# Patient Record
Sex: FEMALE | Race: WHITE | Employment: OTHER | URBAN - METROPOLITAN AREA
[De-identification: names, ages, dates, MRNs, and addresses within clinical notes are randomized per-mention and may not be internally consistent; named-entity substitution may affect disease eponyms.]

---

## 2023-12-29 ENCOUNTER — ANCILLARY PROCEDURE (OUTPATIENT)
Dept: RADIOLOGY | Facility: CLINIC | Age: 63
End: 2023-12-29
Payer: COMMERCIAL

## 2023-12-29 DIAGNOSIS — M25.562 LEFT KNEE PAIN, UNSPECIFIED CHRONICITY: ICD-10-CM

## 2023-12-29 PROCEDURE — 73564 X-RAY EXAM KNEE 4 OR MORE: CPT | Mod: LT

## 2023-12-29 PROCEDURE — 73564 X-RAY EXAM KNEE 4 OR MORE: CPT | Mod: LEFT SIDE | Performed by: RADIOLOGY

## 2024-01-02 ENCOUNTER — OFFICE VISIT (OUTPATIENT)
Dept: ORTHOPEDIC SURGERY | Facility: CLINIC | Age: 64
End: 2024-01-02
Payer: COMMERCIAL

## 2024-01-02 ENCOUNTER — APPOINTMENT (OUTPATIENT)
Dept: RADIOLOGY | Facility: CLINIC | Age: 64
End: 2024-01-02
Payer: COMMERCIAL

## 2024-01-02 DIAGNOSIS — M11.262 CHONDROCALCINOSIS OF LEFT KNEE: ICD-10-CM

## 2024-01-02 DIAGNOSIS — M17.12 PRIMARY OSTEOARTHRITIS OF LEFT KNEE: Primary | ICD-10-CM

## 2024-01-02 PROCEDURE — 99214 OFFICE O/P EST MOD 30 MIN: CPT | Performed by: STUDENT IN AN ORGANIZED HEALTH CARE EDUCATION/TRAINING PROGRAM

## 2024-01-02 PROCEDURE — 2500000004 HC RX 250 GENERAL PHARMACY W/ HCPCS (ALT 636 FOR OP/ED): Performed by: STUDENT IN AN ORGANIZED HEALTH CARE EDUCATION/TRAINING PROGRAM

## 2024-01-02 PROCEDURE — 2500000005 HC RX 250 GENERAL PHARMACY W/O HCPCS: Performed by: STUDENT IN AN ORGANIZED HEALTH CARE EDUCATION/TRAINING PROGRAM

## 2024-01-02 PROCEDURE — 20610 DRAIN/INJ JOINT/BURSA W/O US: CPT | Performed by: STUDENT IN AN ORGANIZED HEALTH CARE EDUCATION/TRAINING PROGRAM

## 2024-01-02 RX ORDER — TRAZODONE HYDROCHLORIDE 150 MG/1
1 TABLET ORAL NIGHTLY
COMMUNITY
Start: 2023-06-14 | End: 2024-06-13

## 2024-01-02 RX ORDER — CITALOPRAM 40 MG/1
40 TABLET, FILM COATED ORAL
COMMUNITY
Start: 2023-06-14 | End: 2024-06-13

## 2024-01-02 RX ORDER — LISINOPRIL 2.5 MG/1
2.5 TABLET ORAL
COMMUNITY
Start: 2023-06-12 | End: 2024-06-11

## 2024-01-02 RX ORDER — ATORVASTATIN CALCIUM 20 MG/1
20 TABLET, FILM COATED ORAL
COMMUNITY
Start: 2023-06-12 | End: 2024-06-11

## 2024-01-02 RX ORDER — ZOLPIDEM TARTRATE 5 MG/1
TABLET ORAL
COMMUNITY
Start: 2023-05-03 | End: 2024-05-08

## 2024-01-02 RX ORDER — SEMAGLUTIDE 0.68 MG/ML
0.5 INJECTION, SOLUTION SUBCUTANEOUS
COMMUNITY
Start: 2023-06-14 | End: 2024-06-13

## 2024-01-02 RX ORDER — EMPAGLIFLOZIN 25 MG/1
25 TABLET, FILM COATED ORAL
COMMUNITY
Start: 2023-06-12 | End: 2024-06-11

## 2024-01-02 RX ORDER — CHOLECALCIFEROL (VITAMIN D3) 25 MCG
TABLET ORAL
COMMUNITY
Start: 2013-01-18

## 2024-01-02 RX ORDER — BUPROPION HYDROCHLORIDE 300 MG/1
300 TABLET ORAL
COMMUNITY
Start: 2023-06-14 | End: 2024-06-13

## 2024-01-02 RX ADMIN — LIDOCAINE HYDROCHLORIDE 4 ML: 10 INJECTION, SOLUTION INFILTRATION; PERINEURAL at 22:22

## 2024-01-02 RX ADMIN — TRIAMCINOLONE ACETONIDE 1 ML: 40 INJECTION, SUSPENSION INTRA-ARTICULAR; INTRAMUSCULAR at 22:22

## 2024-01-02 ASSESSMENT — PAIN - FUNCTIONAL ASSESSMENT: PAIN_FUNCTIONAL_ASSESSMENT: NO/DENIES PAIN

## 2024-01-02 NOTE — PROGRESS NOTES
PRIMARY CARE PHYSICIAN: Mell Miramontes  REFERRING PROVIDER: No referring provider defined for this encounter.       SUBJECTIVE  CHIEF COMPLAINT:   Chief Complaint   Patient presents with    Left Knee - New Patient Visit        HPI: Shannan George is a pleasant 63 y.o. year-old female who is seen today for evaluation of left knee pain. The pain has been present for 6 months. The onset of pain was not associated with a traumatic injury.  Shannan George states that the pain is located in the lateral aspect of her knee.  Shannan George denies any history of inflammatory arthritis. She had her left knee scoped in 1987 and 1990 for likely menisectomy. No history of injections. The pain is aggravated by standing for long periods of time and when she extends her leg and alleviated by rest and flexing her leg. Pain is worse in the morning and improves throughout the day. The patient denies any numbness or tingling of the left lower extremity.    She has a history of type 2 diabetes managed with medications. No history of heart attack, stroke or blood clots. Does have a family history of PEs    FUNCTIONAL STATUS: occasionally limited.  AMBULATORY STATUS: Independent community ambulation without devices  PREVIOUS TREATMENTS: over the counter medications   HISTORY OF SURGERY ON AFFECTED KNEE(S): Yes   HIP OR GROIN PAIN REPORTED: No   SYMPTOMS INTERFERING WITH SLEEP: Yes   INSTABILITY: No   IMPACTING QUALITY OF LIFE: Yes     REVIEW OF SYSTEMS  The patient denies any fever, chills, chest pain, shortness of breath or difficulty breathing.  Patient denies any numbness, tingling, or radicular symptoms.  Adult patient history sheet was filled out by the patient today in clinic and will be scanned into the EMR.  I personally reviewed this form which will be scanned into the EMR.  This includes Past Medical History, Past Surgical History, Medications, Allergies, Social History, Family History and 12 point review of systems.    No past  medical history on file.     Allergies   Allergen Reactions    Naproxen Hives and Itching     (naprosyn) hives and whelts        No past surgical history on file.     No family history on file.     Social History     Socioeconomic History    Marital status:      Spouse name: Not on file    Number of children: Not on file    Years of education: Not on file    Highest education level: Not on file   Occupational History    Not on file   Tobacco Use    Smoking status: Not on file    Smokeless tobacco: Not on file   Substance and Sexual Activity    Alcohol use: Not on file    Drug use: Not on file    Sexual activity: Not on file   Other Topics Concern    Not on file   Social History Narrative    Not on file     Social Determinants of Health     Financial Resource Strain: Not on file   Food Insecurity: Not on file   Transportation Needs: Not on file   Physical Activity: Not on file   Stress: Not on file   Social Connections: Not on file   Intimate Partner Violence: Not on file   Housing Stability: Not on file        CURRENT MEDICATIONS:   Current Outpatient Medications   Medication Sig Dispense Refill    atorvastatin (Lipitor) 20 mg tablet Take 1 tablet (20 mg) by mouth once daily.      buPROPion XL (Wellbutrin XL) 300 mg 24 hr tablet Take 1 tablet (300 mg) by mouth once daily.      cholecalciferol (Vitamin D-3) 25 MCG (1000 UT) tablet 3 po q day      citalopram (CeleXA) 40 mg tablet Take 1 tablet (40 mg) by mouth once daily.      Jardiance 25 mg Take 1 tablet (25 mg) by mouth once daily.      lisinopril 2.5 mg tablet Take 1 tablet (2.5 mg) by mouth once daily.      Ozempic 0.25 mg or 0.5 mg (2 mg/3 mL) pen injector Inject 0.5 mg under the skin.      traZODone (Desyrel) 150 mg tablet Take 1 tablet (150 mg) by mouth once daily at bedtime.      zolpidem (Ambien) 5 mg tablet        No current facility-administered medications for this visit.        OBJECTIVE    PHYSICAL EXAM  There is no height or weight on file to  calculate BMI.    General: Well-appearing female in no acute distress.  Awake, alert and oriented.  Pleasant and cooperative.  Respiratory: Non-labored breathing  Mood: Euthymic   Gait: Non antalgic   Assistive Device: None     Affected Left Knee  Limb Alignment: Neutral  ROM: 5 - 110  Stable to varus and valgus stress at full extension and 30 degrees of flexion  Skin: Intact, no abrasions or draining sinuses  Effusion: None  Quad Strength: 5/5  Hamstring Strength: 5/5  Patella Crepitus: Present  Patella Grind: None  Tenderness: Lateral joint line tenderness  Sensation: Intact to light touch distally  Motor function: Able to fire TA, EHL, G/S  Pulses: Palpable DP pulse    Unaffected Right Knee  Skin: Intact  ROM: 0-120  Effusion: None  No tenderness to palpation on exam    IMAGING:  AP / lateral/ mid-flexion/sunrise views: Independent review of left knee x-rays was performed. The findings were reviewed with the patient. There were calcifications in the lateral compartment, likely chondrocalcinosis. There are mild degenerative changes of the left knee with neutral  limb alignment. There is mild joint space narrowing, subchondral sclerosis, and osteophyte formation. No evidence of fracture, AVN, dislocation, osteomyelitis.        ASSESSMENT & PLAN    IMPRESSION:  Shannan George is a 63 y.o. female who is seen today for evaluation of left knee pain.  It is my impression the patient has chondrocalcinosis with superimposed osteoarthritis.    PLAN:  I had an in-depth discussion about the nature and natural history of degenerative joint disease.  I explained that it is progressive, but there is no way to predict how quickly a patient's symptoms will get worse.  I advised that the patient that they can manage their pain symptomatically, with 3-5 day courses of nonsteroidal anti-inflammatories and activity modification as needed when there is a a flare up. I explained to Shannan George that the best interventions they can take  to perhaps slow the progression of the degenerative changes in the long-term are maintaining a healthy weight and performing low impact exercise such as cycling, walking, and swimming. The use of a walking stick, cane or other assistive device can help as well.     I also discussed more invasive treatment options including injections and radiofrequency nerve ablation. I talked about the risks and benefits of injections, focusing on the fact that there is no way to predict the degree or duration of symptom relief, but that it can provide weeks to months of pain relief in most patients. Should these measures fail, the most invasive option would be joint replacement surgery. The patient should try and delay joint replacement surgery for as long as possible. If the patients' joint problem affects their quality of life and cause significant restrictions of their activities, they may want to consider joint replacement surgery. I briefly covered the risks, benefits and expected recovery after total joint arthroplasty.    Shannan George may one day benefit from an elective total joint replacement however has not yet exhausted other treatment options. I have recommended:  1. Intra-articular injection  Patient ID: Shannan George is a 63 y.o. female.    L Inj/Asp: L knee on 1/2/2024 10:22 PM  Indications: pain and joint swelling  Details: 22 G needle, anterolateral approach  Medications: 4 mL lidocaine 10 mg/mL (1 %); 1 mL triamcinolone acetonide 40 mg/mL    Discussion:  I discussed the conservative treatment options for knee osteoarthritis including but not limited to physical therapy, oral NSAIDS, activity and lifestyle modification, and corticosteroid injections. Pt has elected to undergo a cortisone injection today. I have explained the risk and benefits of an injection including the possibility of joint infection, bleeding, damage to cartilage, allergic reaction. Patient verbalized understanding and gave verbal consent wishes  to proceed with a intra-articular cortisone injection for their knee.    Procedure:  After discussing the risk and benefits of the procedure, we proceeded with an intra-articular left knee injection.    With the patient's informed verbal consent, the left knee was prepped in standard sterile fashion with Chlorhexidine. The skin was then anesthetized with ethyl chloride spray and cleaned again with Chlorhexidine. The knee was then apirated/injected with a prefilled 20-gauge syringe of 40 mg Kenalog + 4 ml Lidocaine using the lateral approach without complications.  The patient tolerated this well and felt immediate initial relief of symptoms. A bandaid was applied and the patient ambulated out of the clinic on ther own accord without difficulty. Patient was instructed to avoid physical activity for 24-48 hours to prevent the knees from swelling and may ice the knees as tolerated. Patient should contact the office if any signs of of infection appear: redness, fever, chills, drainage, swelling or warmth to the knees.  Pt understands that the injections can be repeated no sooner than 3 months.  Procedure, treatment alternatives, risks and benefits explained, specific risks discussed. Consent was given by the patient. Immediately prior to procedure a time out was called to verify the correct patient, procedure, equipment, support staff and site/side marked as required. Patient was prepped and draped in the usual sterile fashion.         We will plan on re-assessing the status in 4 months, or sooner if symptoms worsen.     All of the patient's questions were answered and he was comfortable with this plan of care.  Shannan George was encouraged to call if any problems, questions or concerns arise.     The patient was seen and examined with my resident Dr. Chowdhury who assisted with documentation.  I independently interviewed the patient to obtain a history and performed physical examination.  I formulated the plan of  care.    * This office note was dictated using Dragon voice to text software and was not proofread for spelling or grammatical errors *

## 2024-01-19 RX ORDER — TRIAMCINOLONE ACETONIDE 40 MG/ML
1 INJECTION, SUSPENSION INTRA-ARTICULAR; INTRAMUSCULAR
Status: COMPLETED | OUTPATIENT
Start: 2024-01-02 | End: 2024-01-02

## 2024-01-19 RX ORDER — LIDOCAINE HYDROCHLORIDE 10 MG/ML
4 INJECTION INFILTRATION; PERINEURAL
Status: COMPLETED | OUTPATIENT
Start: 2024-01-02 | End: 2024-01-02

## 2024-05-07 ENCOUNTER — OFFICE VISIT (OUTPATIENT)
Dept: ORTHOPEDIC SURGERY | Facility: CLINIC | Age: 64
End: 2024-05-07
Payer: COMMERCIAL

## 2024-05-07 DIAGNOSIS — M17.12 PRIMARY OSTEOARTHRITIS OF LEFT KNEE: ICD-10-CM

## 2024-05-07 PROCEDURE — 20610 DRAIN/INJ JOINT/BURSA W/O US: CPT | Performed by: STUDENT IN AN ORGANIZED HEALTH CARE EDUCATION/TRAINING PROGRAM

## 2024-05-07 PROCEDURE — 99213 OFFICE O/P EST LOW 20 MIN: CPT | Performed by: STUDENT IN AN ORGANIZED HEALTH CARE EDUCATION/TRAINING PROGRAM

## 2024-05-07 ASSESSMENT — PAIN DESCRIPTION - DESCRIPTORS: DESCRIPTORS: ACHING;PRESSURE

## 2024-05-07 ASSESSMENT — PAIN SCALES - GENERAL: PAINLEVEL_OUTOF10: 2

## 2024-05-07 ASSESSMENT — PAIN - FUNCTIONAL ASSESSMENT: PAIN_FUNCTIONAL_ASSESSMENT: 0-10
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
## 2024-09-10 ENCOUNTER — OFFICE VISIT (OUTPATIENT)
Dept: ORTHOPEDIC SURGERY | Facility: CLINIC | Age: 64
End: 2024-09-10
Payer: COMMERCIAL

## 2024-09-10 DIAGNOSIS — M17.12 PRIMARY OSTEOARTHRITIS OF LEFT KNEE: Primary | ICD-10-CM

## 2024-09-10 PROCEDURE — 2500000004 HC RX 250 GENERAL PHARMACY W/ HCPCS (ALT 636 FOR OP/ED): Performed by: STUDENT IN AN ORGANIZED HEALTH CARE EDUCATION/TRAINING PROGRAM

## 2024-09-10 PROCEDURE — 1036F TOBACCO NON-USER: CPT | Performed by: STUDENT IN AN ORGANIZED HEALTH CARE EDUCATION/TRAINING PROGRAM

## 2024-09-10 PROCEDURE — 20610 DRAIN/INJ JOINT/BURSA W/O US: CPT | Mod: LT | Performed by: STUDENT IN AN ORGANIZED HEALTH CARE EDUCATION/TRAINING PROGRAM

## 2024-09-10 PROCEDURE — 2500000005 HC RX 250 GENERAL PHARMACY W/O HCPCS: Performed by: STUDENT IN AN ORGANIZED HEALTH CARE EDUCATION/TRAINING PROGRAM

## 2024-09-10 PROCEDURE — 99214 OFFICE O/P EST MOD 30 MIN: CPT | Performed by: STUDENT IN AN ORGANIZED HEALTH CARE EDUCATION/TRAINING PROGRAM

## 2024-09-10 RX ORDER — TRIAMCINOLONE ACETONIDE 40 MG/ML
40 INJECTION, SUSPENSION INTRA-ARTICULAR; INTRAMUSCULAR
Status: COMPLETED | OUTPATIENT
Start: 2024-09-10 | End: 2024-09-10

## 2024-09-10 RX ORDER — LIDOCAINE HYDROCHLORIDE 10 MG/ML
4 INJECTION INFILTRATION; PERINEURAL
Status: COMPLETED | OUTPATIENT
Start: 2024-09-10 | End: 2024-09-10
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
## 2025-01-14 ENCOUNTER — APPOINTMENT (OUTPATIENT)
Dept: RADIOLOGY | Facility: CLINIC | Age: 65
End: 2025-01-14
Payer: COMMERCIAL

## 2025-01-14 ENCOUNTER — APPOINTMENT (OUTPATIENT)
Dept: ORTHOPEDIC SURGERY | Facility: CLINIC | Age: 65
End: 2025-01-14
Payer: COMMERCIAL

## 2025-01-14 DIAGNOSIS — M25.562 LEFT KNEE PAIN, UNSPECIFIED CHRONICITY: ICD-10-CM

## 2025-04-01 ENCOUNTER — OFFICE VISIT (OUTPATIENT)
Dept: ORTHOPEDIC SURGERY | Facility: CLINIC | Age: 65
End: 2025-04-01
Payer: COMMERCIAL

## 2025-04-01 ENCOUNTER — HOSPITAL ENCOUNTER (OUTPATIENT)
Dept: RADIOLOGY | Facility: CLINIC | Age: 65
Discharge: HOME | End: 2025-04-01
Payer: COMMERCIAL

## 2025-04-01 DIAGNOSIS — M17.12 PRIMARY OSTEOARTHRITIS OF LEFT KNEE: Primary | ICD-10-CM

## 2025-04-01 DIAGNOSIS — M25.562 LEFT KNEE PAIN, UNSPECIFIED CHRONICITY: ICD-10-CM

## 2025-04-01 PROCEDURE — 73564 X-RAY EXAM KNEE 4 OR MORE: CPT | Mod: LEFT SIDE | Performed by: RADIOLOGY

## 2025-04-01 PROCEDURE — 1036F TOBACCO NON-USER: CPT | Performed by: STUDENT IN AN ORGANIZED HEALTH CARE EDUCATION/TRAINING PROGRAM

## 2025-04-01 PROCEDURE — 99213 OFFICE O/P EST LOW 20 MIN: CPT | Performed by: STUDENT IN AN ORGANIZED HEALTH CARE EDUCATION/TRAINING PROGRAM

## 2025-04-01 PROCEDURE — 99213 OFFICE O/P EST LOW 20 MIN: CPT | Mod: 25 | Performed by: STUDENT IN AN ORGANIZED HEALTH CARE EDUCATION/TRAINING PROGRAM

## 2025-04-01 PROCEDURE — 2500000004 HC RX 250 GENERAL PHARMACY W/ HCPCS (ALT 636 FOR OP/ED): Performed by: STUDENT IN AN ORGANIZED HEALTH CARE EDUCATION/TRAINING PROGRAM

## 2025-04-01 PROCEDURE — 73564 X-RAY EXAM KNEE 4 OR MORE: CPT | Mod: LT

## 2025-04-01 PROCEDURE — 20610 DRAIN/INJ JOINT/BURSA W/O US: CPT | Mod: LT | Performed by: STUDENT IN AN ORGANIZED HEALTH CARE EDUCATION/TRAINING PROGRAM

## 2025-04-01 RX ORDER — LIDOCAINE HYDROCHLORIDE 10 MG/ML
4 INJECTION, SOLUTION INFILTRATION; PERINEURAL
Status: COMPLETED | OUTPATIENT
Start: 2025-04-01 | End: 2025-04-01

## 2025-04-01 RX ORDER — TRIAMCINOLONE ACETONIDE 40 MG/ML
40 INJECTION, SUSPENSION INTRA-ARTICULAR; INTRAMUSCULAR
Status: COMPLETED | OUTPATIENT
Start: 2025-04-01 | End: 2025-04-01

## 2025-04-01 RX ADMIN — TRIAMCINOLONE ACETONIDE 40 MG: 40 INJECTION, SUSPENSION INTRA-ARTICULAR; INTRAMUSCULAR at 14:51

## 2025-04-01 RX ADMIN — LIDOCAINE HYDROCHLORIDE 4 ML: 10 INJECTION, SOLUTION INFILTRATION; PERINEURAL at 14:51

## 2025-04-01 NOTE — PROGRESS NOTES
PRIMARY CARE PHYSICIAN: Mell Miramontes  REFERRING PROVIDER: No referring provider defined for this encounter.       SUBJECTIVE  CHIEF COMPLAINT:   Chief Complaint   Patient presents with    Left Knee - Follow-up        HPI: Shannan George is a pleasant 64 y.o. year-old female who is seen today for follow-up evaluation of left knee pain.  I last saw the patient in September.  That injection provided over that 4-5 months of relief.      The pain returned over the last month.  She has had no fall or trauma.  Pain is diffuse.  She is ambulatory that assistive device.  She is going on vacation soon.    She has a history of type 2 diabetes managed with medications. No history of heart attack, stroke or blood clots. Does have a family history of PEs    FUNCTIONAL STATUS: occasionally limited.  AMBULATORY STATUS: Independent community ambulation without devices  PREVIOUS TREATMENTS: over the counter medications   HISTORY OF SURGERY ON AFFECTED KNEE(S): Yes   HIP OR GROIN PAIN REPORTED: No   SYMPTOMS INTERFERING WITH SLEEP: Yes   INSTABILITY: No   IMPACTING QUALITY OF LIFE: Yes     REVIEW OF SYSTEMS  There has been no interval change in this patient's past medical, surgical, medications, allergies, family history or social history since the most recent visit to a provider within our department.  14 point review of systems was performed, reviewed, and negative except for pertinent positives documented in the history of present illness.    History reviewed. No pertinent past medical history.     Allergies   Allergen Reactions    Naproxen Hives and Itching     (naprosyn) hives and whelts        History reviewed. No pertinent surgical history.     No family history on file.     Social History     Socioeconomic History    Marital status:      Spouse name: Not on file    Number of children: Not on file    Years of education: Not on file    Highest education level: Not on file   Occupational History    Not on file   Tobacco  Use    Smoking status: Never    Smokeless tobacco: Never   Substance and Sexual Activity    Alcohol use: Not on file    Drug use: Not on file    Sexual activity: Not on file   Other Topics Concern    Not on file   Social History Narrative    Not on file     Social Drivers of Health     Financial Resource Strain: Low Risk  (3/4/2025)    Received from [facility]    Overall Financial Resource Strain (CARDIA)     Difficulty of Paying Living Expenses: Not hard at all   Food Insecurity: No Food Insecurity (3/4/2025)    Received from [facility]    Hunger Vital Sign     Worried About Running Out of Food in the Last Year: Never true     Ran Out of Food in the Last Year: Never true   Transportation Needs: No Transportation Needs (3/4/2025)    Received from [facility]    PRAPARE - Transportation     Lack of Transportation (Medical): No     Lack of Transportation (Non-Medical): No   Physical Activity: Insufficiently Active (3/4/2025)    Received from [facility]    Exercise Vital Sign     Days of Exercise per Week: 3 days     Minutes of Exercise per Session: 20 min   Stress: Stress Concern Present (3/4/2025)    Received from [facility]    [employer] [place] [employer] - Occupational Stress Questionnaire     Feeling of Stress : To some extent   Social Connections: Moderately Integrated (3/4/2025)    Received from [facility]    Social Connection and Isolation Panel [NHANES]     Frequency of Communication with Friends and Family: More than three times a week     Frequency of Social Gatherings with Friends and Family: Twice a week     Attends Mormonism Services: 1 to 4 times per year     Active Member of Clubs or Organizations: Yes     Attends Club or Organization Meetings: 1 to 4 times per year     Marital Status:    Intimate Partner Violence: Not At Risk (3/4/2025)    Received from [facility]    Humiliation, Afraid, Rape, and Kick questionnaire     Fear of Current or Ex-Partner: No     Emotionally Abused:  No     Physically Abused: No     Sexually Abused: No   Housing Stability: Low Risk  (3/4/2025)    [facility] from [facility]    Housing Stability Vital Sign     Unable to Pay for Housing in the Last Year: No     Number of Times Moved in the Last Year: 0     Homeless in the Last Year: No        CURRENT MEDICATIONS:   Current Outpatient Medications   Medication Sig Dispense Refill    atorvastatin (Lipitor) 20 mg tablet Take 1 tablet (20 mg) by mouth once daily.      buPROPion XL (Wellbutrin XL) 300 mg 24 hr tablet Take 1 tablet (300 mg) by mouth once daily.      cholecalciferol (Vitamin D-3) 25 MCG (1000 UT) tablet 3 po q day      citalopram (CeleXA) 40 mg tablet Take 1 tablet (40 mg) by mouth once daily.      Jardiance 25 mg Take 1 tablet (25 mg) by mouth once daily.      lisinopril 2.5 mg tablet Take 1 tablet (2.5 mg) by mouth once daily.      Ozempic 0.25 mg or 0.5 mg (2 mg/3 mL) pen injector Inject 0.5 mg under the skin.      traZODone (Desyrel) 150 mg tablet Take 1 tablet (150 mg) by mouth once daily at bedtime.       No current facility-administered medications for this visit.        OBJECTIVE    PHYSICAL EXAM  There is no height or weight on file to calculate BMI.    General: Well-appearing female in no acute distress.  Awake, alert and oriented.  Pleasant and cooperative.  Respiratory: Non-labored breathing  Mood: Euthymic   Gait: Non antalgic   Assistive Device: None     Affected Left Knee  Limb Alignment: Neutral  ROM: 5 - 110  Stable to varus and valgus stress at full extension and 30 degrees of flexion  Skin: Intact, no abrasions or draining sinuses  Effusion: None  Quad Strength: 5/5  Hamstring Strength: 5/5  Patella Crepitus: Present  Patella Grind: None  Tenderness: Lateral joint line tenderness  Sensation: Intact to light touch distally  Motor function: Able to fire TA, EHL, G/S  Pulses: Palpable DP pulse    IMAGING:  Radiographs today demonstrate chondrocalcinosis.  Tricompartmental osteoarthritis with  valgus limb alignment.  No fracture or dislocation.      ASSESSMENT & PLAN    IMPRESSION:  Shannan George is a 64 y.o. female who is seen today for evaluation of left knee pain.  It is my impression the patient has chondrocalcinosis with superimposed osteoarthritis.    PLAN:  Patient had a good response to the intra-articular injection.  We will repeat the injection today.  Risks of repeat injection were discussed.    Patient ID: Shannan George is a 64 y.o. female.    L Inj/Asp: L knee on 4/1/2025 2:51 PM  Indications: pain and joint swelling  Details: 22 G needle, anterolateral approach  Medications: 40 mg triamcinolone acetonide 40 mg/mL; 4 mL lidocaine 10 mg/mL (1 %)  Outcome: tolerated well, no immediate complications    Discussion:  I discussed the conservative treatment options for knee osteoarthritis including but not limited to physical therapy, oral NSAIDS, activity and lifestyle modification, and corticosteroid injections. Pt has elected to undergo a cortisone injection today. I have explained the risk and benefits of an injection including the possibility of joint infection, bleeding, damage to cartilage, allergic reaction. Patient verbalized understanding and gave verbal consent wishes to proceed with a intra-articular cortisone injection for their knee.    Procedure:  After discussing the risk and benefits of the procedure, we proceeded with an intra-articular left knee injection.    With the patient's informed verbal consent, the left knee was prepped in standard sterile fashion with Chlorhexidine. The skin was then anesthetized with ethyl chloride spray and cleaned again with Chlorhexidine. The knee was then apirated/injected with a prefilled 20-gauge syringe of 40 mg Kenalog + 4 ml Lidocaine using the lateral approach without complications.  The patient tolerated this well and felt immediate initial relief of symptoms. A bandaid was applied and the patient ambulated out of the clinic on ther own accord  without difficulty. Patient was instructed to avoid physical activity for 24-48 hours to prevent the knees from swelling and may ice the knees as tolerated. Patient should contact the office if any signs of of infection appear: redness, fever, chills, drainage, swelling or warmth to the knees.  Pt understands that the injections can be repeated no sooner than 3 months.  Procedure, treatment alternatives, risks and benefits explained, specific risks discussed. Consent was given by the patient. Immediately prior to procedure a time out was called to verify the correct patient, procedure, equipment, support staff and site/side marked as required. Patient was prepped and draped in the usual sterile fashion.       Follow-up in 6 months.  No radiographs unless there is a significant change in the patient's symptoms.    *This note was created using voice recognition software and was not corrected for typographical or grammatical errors.*

## 2025-07-29 NOTE — PROGRESS NOTES
VELIA George is a pleasant 64 y.o. year-old female here for follow-up of known underlying left knee osteoarthritis. She presents for repeat evaluation. Their last injection was 25. She is getting about 3 months of relief out of corticosteroid injections and reports it has worked well.  She feels she has a 100% improvement of her symptoms after the injection. The patient notes persistent pain as well as some occasional mechanical symptoms. Pain level: Ranges from a 3 to a 9. She uses OTC medications for pain control. She has not had any interval fall or trauma. Pt ambulates without an assistive device. Pain is located in the anterior and lateral aspect of the knee. The Pt has elected to undergo injection today.     Review of systems  There has been no interval change in this patient's past medical, surgical, medications, allergies, family history or social history since the most recent visit to a provider within our department. 14 point review of systems was performed, reviewed, and negative except for pertinent positives documented in the history of present illness.    Medical History[1]  Problem List[2]  Medication Documentation Review Audit       Reviewed by Wendy Ledesma MA (Medical Assistant) on 25 at 1352      Medication Order Taking? Sig Documenting Provider Last Dose Status   atorvastatin (Lipitor) 20 mg tablet [id number] No Take 1 tablet (20 mg) by mouth once daily. Historical Provider, MD Taking  24 235   buPROPion XL (Wellbutrin XL) 300 mg 24 hr tablet [id number] No Take 1 tablet (300 mg) by mouth once daily. Historical Provider, MD Taking  24 235   cholecalciferol (Vitamin D-3) 25 MCG (1000 UT) tablet [id number] No 3 po q day Historical Provider, MD Taking Active   citalopram (CeleXA) 40 mg tablet [id number] No Take 1 tablet (40 mg) by mouth once daily. Historical MD Jenny Taking  24 235   Jardiance 25 mg [id number] No Take 1 tablet (25  mg) by mouth once daily. Historical Provider, MD Taking  24   lisinopril 2.5 mg tablet [id number] No Take 1 tablet (2.5 mg) by mouth once daily. Mumtaz Alvarado MD Taking  24   Ozempic 0.25 mg or 0.5 mg (2 mg/3 mL) pen injector [id number] No Inject 0.5 mg under the skin. Mumtaz Alvarado MD Taking  24   traZODone (Desyrel) 150 mg tablet [id number] No Take 1 tablet (150 mg) by mouth once daily at bedtime. Mumtaz Alvarado MD Taking  24                  RX Allergies[3]  Social History     Socioeconomic History    Marital status:      Spouse name: Not on file    Number of children: Not on file    Years of education: Not on file    Highest education level: Not on file   Occupational History    Not on file   Tobacco Use    Smoking status: Never    Smokeless tobacco: Never   Substance and Sexual Activity    Alcohol use: Never    Drug use: Never    Sexual activity: Not Currently     Partners: Male     Birth control/protection: Post-menopausal   Other Topics Concern    Not on file   Social History Narrative    Not on file     Social Drivers of Health     Financial Resource Strain: Low Risk  (3/4/2025)    Received from [facility]    Overall Financial Resource Strain (CARDIA)     Difficulty of Paying Living Expenses: Not hard at all   Food Insecurity: No Food Insecurity (3/4/2025)    Received from [facility]    Hunger Vital Sign     Worried About Running Out of Food in the Last Year: Never true     Ran Out of Food in the Last Year: Never true   Transportation Needs: No Transportation Needs (3/4/2025)    Received from [facility]    PRAPARE - Transportation     Lack of Transportation (Medical): No     Lack of Transportation (Non-Medical): No   Physical Activity: Insufficiently Active (3/4/2025)    Received from [facility]    Exercise Vital Sign     Days of Exercise per Week: 3 days     Minutes of Exercise per Session: 20 min   Stress:  Stress Concern Present (3/4/2025)    Received from [facility]    [place] [employer] - Occupational Stress Questionnaire     Feeling of Stress : To some extent   Social Connections: Moderately Integrated (3/4/2025)    Received from [employer] [facility]    Social Connection and Isolation Panel     Frequency of Communication with Friends and Family: More than three times a week     Frequency of Social Gatherings with Friends and Family: Twice a week     Attends Holiness Services: 1 to 4 times per year     Active Member of Clubs or Organizations: Yes     Attends Club or Organization Meetings: 1 to 4 times per year     Marital Status:    Intimate Partner Violence: Not At Risk (3/4/2025)    Received from [facility] [facility]    Humiliation, Afraid, Rape, and Kick questionnaire     Fear of Current or Ex-Partner: No     Emotionally Abused: No     Physically Abused: No     Sexually Abused: No   Housing Stability: Low Risk  (3/4/2025)    Received from [facility] [facility]    Housing Stability Vital Sign     Unable to Pay for Housing in the Last Year: No     Number of Times Moved in the Last Year: 0     Homeless in the Last Year: No     Surgical History[4]    Physical Exam  General: Well-appearing female in no acute distress.  Awake, alert and oriented.  Pleasant and cooperative.  Respiratory: Non-labored breathing  Mood: Euthymic   Gait: Non antalgic  Assistive Device: no device  Skin: Warm, dry and intact without lesions.    Affected Left Knee  Limb Alignment: Neutral  ROM: 5 - 110  Stable to varus and valgus stress at full extension and 30 degrees of flexion  Skin: Intact, no abrasions or draining sinuses  Effusion: None  Quad Strength: 5/5  Hamstring Strength: 5/5  Patella Crepitus: Present  Patella Grind: None  Tenderness: TTP over the anterior aspect of the knee  Sensation: Intact to light touch distally  Motor function: Able to fire TA, EHL, G/S  Pulses: Palpable DP pulse     Imaging  No imaging obtained  today.    Impression/Plan  Shannan George is a 64 y.o. female who is seen today for chondrocalcinosis with superimposed osteoarthritis who I am seeing today for follow-up evaluation of left knee pain in the setting of moderate osteoarthritis getting adequate relief from injection therapy.    We discussed conservative treatment options. The patient is still responding well to intra-articular injections. I do not see a clear indication to move forward with arthroplasty. I do believe that at some point in the future, patient will benefit from a total knee arthroplasty but we can continue with conservative treatments until such time.     Patient was instructed to avoid physical activity for 24-48 hours to prevent the knees from swelling and may ice the knees as tolerated. Patient should avoid tub bathing or swimming for 48 hours. Patient should contact the office if any signs of of infection appear: redness, fever, chills, drainage, swelling or warmth to the knees. Pt understands that the injections can be repeated no sooner than 3 months.     I discussed a 30 day trial of Meloxicam to help with the pain and inflammation. Risks and benefits were discussed including but not limited to stomach problems (such as bleeding, ulcer and stomach upset), and kidney problems. Patient understanding and aware of risks. Recommended to take with food. Instructed the pt they can not take with other NSAID medications.    Primary Osteoarthritis left Knee    Procedure  Patient ID: Shannan George is a 64 y.o. female.    L Inj/Asp: L knee on 7/30/2025 2:06 PM  Indications: pain and joint swelling  Details: 22 G needle, anterolateral approach  Medications: 40 mg triamcinolone acetonide 40 mg/mL; 4 mL lidocaine 10 mg/mL (1 %)  Outcome: tolerated well, no immediate complications    Discussion:  I discussed the conservative treatment options for knee osteoarthritis including but not limited to physical therapy, oral NSAIDS, activity and lifestyle  modification, and corticosteroid injections. Pt has elected to undergo a cortisone injection today. I have explained the risk and benefits of an injection including the possibility of joint infection, bleeding, damage to cartilage, allergic reaction. Patient verbalized understanding and gave verbal consent wishes to proceed with a intra-articular cortisone injection for their knee.    Procedure:  After discussing the risk and benefits of the procedure, we proceeded with an intra-articular left knee injection.    With the patient's informed verbal consent, the left knee was prepped in standard sterile fashion with Chlorhexidine. The skin was then anesthetized with ethyl chloride spray and cleaned again with Chlorhexidine. The knee was then apirated/injected with a prefilled 20-gauge syringe of 40 mg Kenalog + 4 ml Lidocaine using the lateral approach without complications.  The patient tolerated this well and felt immediate initial relief of symptoms. A bandaid was applied and the patient ambulated out of the clinic on ther own accord without difficulty. Patient was instructed to avoid physical activity for 24-48 hours to prevent the knees from swelling and may ice the knees as tolerated. Patient should contact the office if any signs of of infection appear: redness, fever, chills, drainage, swelling or warmth to the knees.  Pt understands that the injections can be repeated no sooner than 3 months.  Procedure, treatment alternatives, risks and benefits explained, specific risks discussed. Consent was given by the patient. Immediately prior to procedure a time out was called to verify the correct patient, procedure, equipment, support staff and site/side marked as required. Patient was prepped and draped in the usual sterile fashion.           All questions were answered.    Follow-up 3 months no radiographs.      The patient was seen and examined with my Physician Assistant Marivel Posey PA-C, who assisted with  documentation. I saw and evaluated the patient. I personally obtained the key and critical portions of the history and physical exam. I reviewed the documentation and formulated the plan of care. I agree with the medical decision making as documented in the note.         [1]   Past Medical History:  Diagnosis Date    Diabetes mellitus (Multi) 2000    Lumbosacral disc disease 2024    Tear of meniscus of knee 1978   [2] There is no problem list on file for this patient.  [3]   Allergies  Allergen Reactions    Naproxen Hives and Itching     (naprosyn) hives and whelts   [4]   Past Surgical History:  Procedure Laterality Date    MENISCECTOMY  1987, 1990

## 2025-07-30 ENCOUNTER — APPOINTMENT (OUTPATIENT)
Dept: RADIOLOGY | Facility: CLINIC | Age: 65
End: 2025-07-30
Payer: COMMERCIAL

## 2025-07-30 ENCOUNTER — APPOINTMENT (OUTPATIENT)
Dept: ORTHOPEDIC SURGERY | Facility: CLINIC | Age: 65
End: 2025-07-30
Payer: COMMERCIAL

## 2025-07-30 DIAGNOSIS — M17.12 PRIMARY OSTEOARTHRITIS OF LEFT KNEE: Primary | ICD-10-CM

## 2025-07-30 PROCEDURE — 99214 OFFICE O/P EST MOD 30 MIN: CPT

## 2025-07-30 PROCEDURE — 20610 DRAIN/INJ JOINT/BURSA W/O US: CPT | Performed by: STUDENT IN AN ORGANIZED HEALTH CARE EDUCATION/TRAINING PROGRAM

## 2025-07-30 PROCEDURE — 1036F TOBACCO NON-USER: CPT | Performed by: STUDENT IN AN ORGANIZED HEALTH CARE EDUCATION/TRAINING PROGRAM

## 2025-07-30 RX ORDER — LIDOCAINE HYDROCHLORIDE 10 MG/ML
4 INJECTION, SOLUTION INFILTRATION; PERINEURAL
Status: COMPLETED | OUTPATIENT
Start: 2025-07-30 | End: 2025-07-30

## 2025-07-30 RX ORDER — MELOXICAM 15 MG/1
15 TABLET ORAL DAILY
Qty: 30 TABLET | Refills: 0 | Status: SHIPPED | OUTPATIENT
Start: 2025-07-30 | End: 2025-08-29

## 2025-07-30 RX ORDER — TRIAMCINOLONE ACETONIDE 40 MG/ML
40 INJECTION, SUSPENSION INTRA-ARTICULAR; INTRAMUSCULAR
Status: COMPLETED | OUTPATIENT
Start: 2025-07-30 | End: 2025-07-30

## 2025-07-30 RX ADMIN — TRIAMCINOLONE ACETONIDE 40 MG: 40 INJECTION, SUSPENSION INTRA-ARTICULAR; INTRAMUSCULAR at 14:06

## 2025-07-30 RX ADMIN — LIDOCAINE HYDROCHLORIDE 4 ML: 10 INJECTION, SOLUTION INFILTRATION; PERINEURAL at 14:06

## 2025-08-25 DIAGNOSIS — M17.12 PRIMARY OSTEOARTHRITIS OF LEFT KNEE: ICD-10-CM

## 2025-08-25 RX ORDER — MELOXICAM 15 MG/1
15 TABLET ORAL DAILY
Qty: 30 TABLET | Refills: 0 | Status: SHIPPED | OUTPATIENT
Start: 2025-08-25

## 2025-08-26 ENCOUNTER — APPOINTMENT (OUTPATIENT)
Dept: ORTHOPEDIC SURGERY | Facility: CLINIC | Age: 65
End: 2025-08-26
Payer: COMMERCIAL

## 2025-10-29 ENCOUNTER — APPOINTMENT (OUTPATIENT)
Dept: ORTHOPEDIC SURGERY | Facility: CLINIC | Age: 65
End: 2025-10-29
Payer: COMMERCIAL